# Patient Record
Sex: FEMALE | Race: WHITE | NOT HISPANIC OR LATINO | Employment: OTHER | URBAN - METROPOLITAN AREA
[De-identification: names, ages, dates, MRNs, and addresses within clinical notes are randomized per-mention and may not be internally consistent; named-entity substitution may affect disease eponyms.]

---

## 2018-01-26 ENCOUNTER — CATARACT CONSULT (OUTPATIENT)
Dept: URBAN - METROPOLITAN AREA CLINIC 35 | Facility: CLINIC | Age: 63
End: 2018-01-26

## 2018-01-26 VITALS
DIASTOLIC BLOOD PRESSURE: 99 MMHG | HEIGHT: 55 IN | SYSTOLIC BLOOD PRESSURE: 150 MMHG | RESPIRATION RATE: 14 BRPM | HEART RATE: 74 BPM

## 2018-01-26 DIAGNOSIS — Z98.890: ICD-10-CM

## 2018-01-26 DIAGNOSIS — H25.812: ICD-10-CM

## 2018-01-26 DIAGNOSIS — H25.811: ICD-10-CM

## 2018-01-26 PROCEDURE — 92004 COMPRE OPH EXAM NEW PT 1/>: CPT

## 2018-01-26 PROCEDURE — 92136TC INTERFEROMETRY - TECHNICAL COMPONENT

## 2018-01-26 PROCEDURE — 92134 CPTRZ OPH DX IMG PST SGM RTA: CPT

## 2018-01-26 PROCEDURE — 92015 DETERMINE REFRACTIVE STATE: CPT

## 2018-01-26 RX ORDER — NEPAFENAC 3 MG/ML: 1 SUSPENSION/ DROPS OPHTHALMIC ONCE A DAY

## 2018-01-26 RX ORDER — MOXIFLOXACIN HYDROCHLORIDE 5 MG/ML: 1 SOLUTION/ DROPS OPHTHALMIC

## 2018-01-26 RX ORDER — DUREZOL 0.5 MG/ML: 1 EMULSION OPHTHALMIC TWICE A DAY

## 2018-01-26 ASSESSMENT — VISUAL ACUITY
OS_SC: J10
OS_CC: 20/40
OS_CC: J8
OS_SC: 20/200
OD_SC: 20/400
OD_CC: 20/80
OD_SC: J12

## 2018-01-26 ASSESSMENT — TONOMETRY
OS_IOP_MMHG: 14
OD_IOP_MMHG: 12

## 2018-05-10 ENCOUNTER — SURGERY/PROCEDURE (OUTPATIENT)
Dept: URBAN - METROPOLITAN AREA CLINIC 35 | Facility: CLINIC | Age: 63
End: 2018-05-10

## 2018-05-10 DIAGNOSIS — H25.811: ICD-10-CM

## 2018-05-10 PROCEDURE — 66984 XCAPSL CTRC RMVL W/O ECP: CPT

## 2018-05-11 ENCOUNTER — POST-OP CATARACT (OUTPATIENT)
Dept: URBAN - METROPOLITAN AREA CLINIC 35 | Facility: CLINIC | Age: 63
End: 2018-05-11

## 2018-05-11 DIAGNOSIS — H25.812: ICD-10-CM

## 2018-05-11 DIAGNOSIS — Z96.1: ICD-10-CM

## 2018-05-11 PROCEDURE — 99024 POSTOP FOLLOW-UP VISIT: CPT

## 2018-05-11 ASSESSMENT — TONOMETRY: OD_IOP_MMHG: 22

## 2018-05-11 ASSESSMENT — VISUAL ACUITY: OD_SC: 20/30-2

## 2018-05-17 ENCOUNTER — SURGERY/PROCEDURE (OUTPATIENT)
Dept: URBAN - METROPOLITAN AREA CLINIC 35 | Facility: CLINIC | Age: 63
End: 2018-05-17

## 2018-05-17 ENCOUNTER — POST OP/EVAL OF SECOND EYE (OUTPATIENT)
Dept: URBAN - METROPOLITAN AREA SURGERY 14 | Facility: SURGERY | Age: 63
End: 2018-05-17

## 2018-05-17 DIAGNOSIS — H25.812: ICD-10-CM

## 2018-05-17 PROCEDURE — G8427 DOCREV CUR MEDS BY ELIG CLIN: HCPCS

## 2018-05-17 PROCEDURE — 4040F PNEUMOC VAC/ADMIN/RCVD: CPT

## 2018-05-17 PROCEDURE — G8482 FLU IMMUNIZE ORDER/ADMIN: HCPCS

## 2018-05-17 PROCEDURE — 66984 XCAPSL CTRC RMVL W/O ECP: CPT

## 2018-05-17 PROCEDURE — G8785 BP SCRN NO PERF AT INTERVAL: HCPCS

## 2018-05-17 PROCEDURE — 99213 OFFICE O/P EST LOW 20 MIN: CPT

## 2018-05-17 PROCEDURE — 1036F TOBACCO NON-USER: CPT

## 2018-05-17 ASSESSMENT — TONOMETRY
OD_IOP_MMHG: 12
OS_IOP_MMHG: 14

## 2018-05-17 ASSESSMENT — VISUAL ACUITY
OD_SC: 20/20
OS_SC: 20/200
OS_CC: 20/40
OS_BAT: 20/200

## 2018-05-18 ENCOUNTER — CATARACT POST-OP 1-DAY (OUTPATIENT)
Dept: URBAN - METROPOLITAN AREA CLINIC 35 | Facility: CLINIC | Age: 63
End: 2018-05-18

## 2018-05-18 DIAGNOSIS — Z96.1: ICD-10-CM

## 2018-05-18 ASSESSMENT — VISUAL ACUITY
OD_SC: 20/20
OS_SC: 20/40-1

## 2018-05-18 ASSESSMENT — TONOMETRY: OS_IOP_MMHG: 16

## 2018-06-01 ENCOUNTER — POST-OP CATARACT (OUTPATIENT)
Dept: URBAN - METROPOLITAN AREA CLINIC 35 | Facility: CLINIC | Age: 63
End: 2018-06-01

## 2018-06-01 DIAGNOSIS — Z96.1: ICD-10-CM

## 2018-06-01 PROCEDURE — 99024 POSTOP FOLLOW-UP VISIT: CPT

## 2018-06-01 ASSESSMENT — VISUAL ACUITY
OS_SC: 20/50
OS_SC: J<12
OD_SC: 20/60-1
OD_SC: J<12

## 2018-06-01 ASSESSMENT — TONOMETRY
OD_IOP_MMHG: 16
OS_IOP_MMHG: 16

## 2018-06-15 ENCOUNTER — RETINA CONSULT (OUTPATIENT)
Dept: URBAN - METROPOLITAN AREA CLINIC 43 | Facility: CLINIC | Age: 63
End: 2018-06-15

## 2018-06-15 DIAGNOSIS — H43.813: ICD-10-CM

## 2018-06-15 DIAGNOSIS — H35.353: ICD-10-CM

## 2018-06-15 DIAGNOSIS — H35.30: ICD-10-CM

## 2018-06-15 DIAGNOSIS — H35.373: ICD-10-CM

## 2018-06-15 DIAGNOSIS — E11.3211: ICD-10-CM

## 2018-06-15 DIAGNOSIS — E11.3212: ICD-10-CM

## 2018-06-15 DIAGNOSIS — E11.39: ICD-10-CM

## 2018-06-15 PROCEDURE — J2778DME LUCENTIS 0.3MG

## 2018-06-15 PROCEDURE — 2022F DILAT RTA XM EVC RTNOPTHY: CPT

## 2018-06-15 PROCEDURE — 9222550 BILAT EXTENDED OPHTHALMOSCOPY, FIRST

## 2018-06-15 PROCEDURE — 67028 INJECTION EYE DRUG: CPT

## 2018-06-15 PROCEDURE — 92014 COMPRE OPH EXAM EST PT 1/>: CPT

## 2018-06-15 PROCEDURE — 2019F DILATED MACUL EXAM DONE: CPT

## 2018-06-15 PROCEDURE — 92250 FUNDUS PHOTOGRAPHY W/I&R: CPT

## 2018-06-15 PROCEDURE — 92287 ANT SGM IMG IR FLRSCN ANGRPH: CPT

## 2018-06-15 PROCEDURE — G8785 BP SCRN NO PERF AT INTERVAL: HCPCS

## 2018-06-15 PROCEDURE — 1036F TOBACCO NON-USER: CPT

## 2018-06-15 PROCEDURE — 4177F TALK PT/CRGVR RE AREDS PREV: CPT

## 2018-06-15 PROCEDURE — G8428 CUR MEDS NOT DOCUMENT: HCPCS

## 2018-06-15 PROCEDURE — 92235 FLUORESCEIN ANGRPH MLTIFRAME: CPT

## 2018-06-15 PROCEDURE — G8397 DIL MACULA/FUNDUS EXAM/W DOC: HCPCS

## 2018-06-15 PROCEDURE — 5010F MACUL RESULT PHY/QHP MNG DM: CPT

## 2018-06-15 ASSESSMENT — TONOMETRY
OS_IOP_MMHG: 12
OD_IOP_MMHG: 13

## 2018-06-15 ASSESSMENT — VISUAL ACUITY
OD_SC: 20/40
OS_PH: 20/25
OS_SC: 20/40

## 2018-07-19 ENCOUNTER — ESTABLISHED PATIENT (OUTPATIENT)
Dept: URBAN - METROPOLITAN AREA CLINIC 35 | Facility: CLINIC | Age: 63
End: 2018-07-19

## 2018-07-19 DIAGNOSIS — H43.813: ICD-10-CM

## 2018-07-19 DIAGNOSIS — H35.353: ICD-10-CM

## 2018-07-19 DIAGNOSIS — H35.373: ICD-10-CM

## 2018-07-19 DIAGNOSIS — E11.3211: ICD-10-CM

## 2018-07-19 DIAGNOSIS — H35.30: ICD-10-CM

## 2018-07-19 DIAGNOSIS — E11.3212: ICD-10-CM

## 2018-07-19 PROCEDURE — 4177F TALK PT/CRGVR RE AREDS PREV: CPT

## 2018-07-19 PROCEDURE — G8427 DOCREV CUR MEDS BY ELIG CLIN: HCPCS

## 2018-07-19 PROCEDURE — 5010F MACUL RESULT PHY/QHP MNG DM: CPT

## 2018-07-19 PROCEDURE — 9222650 BILAT EXTENDED OPHTHALMOSCOPY, F/U

## 2018-07-19 PROCEDURE — 2022F DILAT RTA XM EVC RTNOPTHY: CPT

## 2018-07-19 PROCEDURE — 92134 CPTRZ OPH DX IMG PST SGM RTA: CPT

## 2018-07-19 PROCEDURE — J2778DME LUCENTIS 0.3MG

## 2018-07-19 PROCEDURE — 1036F TOBACCO NON-USER: CPT

## 2018-07-19 PROCEDURE — 6702850 BILATERAL INTRAVITREAL INJECTION

## 2018-07-19 PROCEDURE — 2019F DILATED MACUL EXAM DONE: CPT

## 2018-07-19 PROCEDURE — G8397 DIL MACULA/FUNDUS EXAM/W DOC: HCPCS

## 2018-07-19 PROCEDURE — G8785 BP SCRN NO PERF AT INTERVAL: HCPCS

## 2018-07-19 PROCEDURE — 92012 INTRM OPH EXAM EST PATIENT: CPT

## 2018-07-19 ASSESSMENT — VISUAL ACUITY
OD_SC: 20/40+2
OS_SC: 20/40

## 2018-07-19 ASSESSMENT — TONOMETRY
OS_IOP_MMHG: 17
OD_IOP_MMHG: 24

## 2018-08-16 ENCOUNTER — ESTABLISHED PATIENT (OUTPATIENT)
Dept: URBAN - METROPOLITAN AREA CLINIC 35 | Facility: CLINIC | Age: 63
End: 2018-08-16

## 2018-08-16 DIAGNOSIS — H35.373: ICD-10-CM

## 2018-08-16 DIAGNOSIS — E11.3212: ICD-10-CM

## 2018-08-16 DIAGNOSIS — H43.813: ICD-10-CM

## 2018-08-16 DIAGNOSIS — H35.353: ICD-10-CM

## 2018-08-16 DIAGNOSIS — E11.3211: ICD-10-CM

## 2018-08-16 DIAGNOSIS — H35.30: ICD-10-CM

## 2018-08-16 DIAGNOSIS — Z98.890: ICD-10-CM

## 2018-08-16 DIAGNOSIS — E11.39: ICD-10-CM

## 2018-08-16 PROCEDURE — 1036F TOBACCO NON-USER: CPT

## 2018-08-16 PROCEDURE — 6702850 BILATERAL INTRAVITREAL INJECTION

## 2018-08-16 PROCEDURE — 4177F TALK PT/CRGVR RE AREDS PREV: CPT

## 2018-08-16 PROCEDURE — 92012 INTRM OPH EXAM EST PATIENT: CPT

## 2018-08-16 PROCEDURE — G8427 DOCREV CUR MEDS BY ELIG CLIN: HCPCS

## 2018-08-16 PROCEDURE — G8785 BP SCRN NO PERF AT INTERVAL: HCPCS

## 2018-08-16 PROCEDURE — 92235 FLUORESCEIN ANGRPH MLTIFRAME: CPT

## 2018-08-16 PROCEDURE — 2022F DILAT RTA XM EVC RTNOPTHY: CPT

## 2018-08-16 PROCEDURE — J2778DME LUCENTIS 0.3MG

## 2018-08-16 PROCEDURE — 5010F MACUL RESULT PHY/QHP MNG DM: CPT

## 2018-08-16 PROCEDURE — 9222650 BILAT EXTENDED OPHTHALMOSCOPY, F/U

## 2018-08-16 PROCEDURE — G9974 MAC EXAM PERF: HCPCS

## 2018-08-16 PROCEDURE — 92250 FUNDUS PHOTOGRAPHY W/I&R: CPT

## 2018-08-16 PROCEDURE — G8397 DIL MACULA/FUNDUS EXAM/W DOC: HCPCS

## 2018-08-16 PROCEDURE — 2019F DILATED MACUL EXAM DONE: CPT

## 2018-08-16 PROCEDURE — G9903 PT SCRN TBCO ID AS NON USER: HCPCS

## 2018-08-16 PROCEDURE — 92287 ANT SGM IMG IR FLRSCN ANGRPH: CPT

## 2018-08-16 ASSESSMENT — TONOMETRY
OS_IOP_MMHG: 25
OD_IOP_MMHG: 21

## 2018-08-16 ASSESSMENT — VISUAL ACUITY
OD_SC: 20/40+2
OS_PH: 20/20
OD_PH: 20/25
OS_SC: 20/40

## 2018-09-27 ENCOUNTER — ESTABLISHED PATIENT (OUTPATIENT)
Dept: URBAN - METROPOLITAN AREA CLINIC 35 | Facility: CLINIC | Age: 63
End: 2018-09-27

## 2018-09-27 DIAGNOSIS — H35.373: ICD-10-CM

## 2018-09-27 DIAGNOSIS — H35.353: ICD-10-CM

## 2018-09-27 DIAGNOSIS — E11.3211: ICD-10-CM

## 2018-09-27 DIAGNOSIS — E11.3212: ICD-10-CM

## 2018-09-27 DIAGNOSIS — H43.813: ICD-10-CM

## 2018-09-27 DIAGNOSIS — H35.30: ICD-10-CM

## 2018-09-27 PROCEDURE — G9974 MAC EXAM PERF: HCPCS

## 2018-09-27 PROCEDURE — 2022F DILAT RTA XM EVC RTNOPTHY: CPT

## 2018-09-27 PROCEDURE — G8428 CUR MEDS NOT DOCUMENT: HCPCS

## 2018-09-27 PROCEDURE — 6702850 BILATERAL INTRAVITREAL INJECTION

## 2018-09-27 PROCEDURE — 92134 CPTRZ OPH DX IMG PST SGM RTA: CPT

## 2018-09-27 PROCEDURE — G9903 PT SCRN TBCO ID AS NON USER: HCPCS

## 2018-09-27 PROCEDURE — 4177F TALK PT/CRGVR RE AREDS PREV: CPT

## 2018-09-27 PROCEDURE — G8785 BP SCRN NO PERF AT INTERVAL: HCPCS

## 2018-09-27 PROCEDURE — 9222650 BILAT EXTENDED OPHTHALMOSCOPY, F/U

## 2018-09-27 PROCEDURE — 5010F MACUL RESULT PHY/QHP MNG DM: CPT

## 2018-09-27 PROCEDURE — 92012 INTRM OPH EXAM EST PATIENT: CPT

## 2018-09-27 PROCEDURE — 2019F DILATED MACUL EXAM DONE: CPT

## 2018-09-27 PROCEDURE — G8397 DIL MACULA/FUNDUS EXAM/W DOC: HCPCS

## 2018-09-27 PROCEDURE — 1036F TOBACCO NON-USER: CPT

## 2018-09-27 PROCEDURE — J2778DME LUCENTIS 0.3MG

## 2018-09-27 ASSESSMENT — TONOMETRY
OD_IOP_MMHG: 21
OS_IOP_MMHG: 21

## 2018-09-27 ASSESSMENT — VISUAL ACUITY
OS_SC: 20/25-1
OD_SC: 20/30-1

## 2018-11-05 ENCOUNTER — EST. PATIENT EMERGENCY (OUTPATIENT)
Dept: URBAN - METROPOLITAN AREA CLINIC 35 | Facility: CLINIC | Age: 63
End: 2018-11-05

## 2018-11-05 DIAGNOSIS — E11.3211: ICD-10-CM

## 2018-11-05 DIAGNOSIS — G43.119: ICD-10-CM

## 2018-11-05 DIAGNOSIS — E11.3212: ICD-10-CM

## 2018-11-05 PROCEDURE — 92012 INTRM OPH EXAM EST PATIENT: CPT

## 2018-11-05 PROCEDURE — G9903 PT SCRN TBCO ID AS NON USER: HCPCS

## 2018-11-05 PROCEDURE — 2022F DILAT RTA XM EVC RTNOPTHY: CPT

## 2018-11-05 PROCEDURE — G8427 DOCREV CUR MEDS BY ELIG CLIN: HCPCS

## 2018-11-05 PROCEDURE — 5010F MACUL RESULT PHY/QHP MNG DM: CPT

## 2018-11-05 PROCEDURE — 1036F TOBACCO NON-USER: CPT

## 2018-11-05 PROCEDURE — 92134 CPTRZ OPH DX IMG PST SGM RTA: CPT

## 2018-11-05 PROCEDURE — G8397 DIL MACULA/FUNDUS EXAM/W DOC: HCPCS

## 2018-11-05 ASSESSMENT — TONOMETRY
OS_IOP_MMHG: 20
OD_IOP_MMHG: 20

## 2018-11-05 ASSESSMENT — VISUAL ACUITY
OD_SC: 20/50+2
OS_SC: 20/40-1

## 2018-11-08 ENCOUNTER — ESTABLISHED PATIENT (OUTPATIENT)
Dept: URBAN - METROPOLITAN AREA CLINIC 35 | Facility: CLINIC | Age: 63
End: 2018-11-08

## 2018-11-08 DIAGNOSIS — G43.119: ICD-10-CM

## 2018-11-08 DIAGNOSIS — E11.39: ICD-10-CM

## 2018-11-08 DIAGNOSIS — E11.3211: ICD-10-CM

## 2018-11-08 DIAGNOSIS — R51: ICD-10-CM

## 2018-11-08 DIAGNOSIS — H35.353: ICD-10-CM

## 2018-11-08 DIAGNOSIS — H35.30: ICD-10-CM

## 2018-11-08 DIAGNOSIS — E11.3212: ICD-10-CM

## 2018-11-08 PROCEDURE — 92235 FLUORESCEIN ANGRPH MLTIFRAME: CPT

## 2018-11-08 PROCEDURE — 2019F DILATED MACUL EXAM DONE: CPT

## 2018-11-08 PROCEDURE — 5010F MACUL RESULT PHY/QHP MNG DM: CPT

## 2018-11-08 PROCEDURE — 92287 ANT SGM IMG IR FLRSCN ANGRPH: CPT

## 2018-11-08 PROCEDURE — 92014 COMPRE OPH EXAM EST PT 1/>: CPT

## 2018-11-08 PROCEDURE — 1036F TOBACCO NON-USER: CPT

## 2018-11-08 PROCEDURE — 67028 INJECTION EYE DRUG: CPT

## 2018-11-08 PROCEDURE — G8397 DIL MACULA/FUNDUS EXAM/W DOC: HCPCS

## 2018-11-08 PROCEDURE — J2778DME LUCENTIS 0.3MG

## 2018-11-08 PROCEDURE — G9903 PT SCRN TBCO ID AS NON USER: HCPCS

## 2018-11-08 PROCEDURE — 9222650 BILAT EXTENDED OPHTHALMOSCOPY, F/U

## 2018-11-08 PROCEDURE — G8427 DOCREV CUR MEDS BY ELIG CLIN: HCPCS

## 2018-11-08 PROCEDURE — 4177F TALK PT/CRGVR RE AREDS PREV: CPT

## 2018-11-08 PROCEDURE — G8785 BP SCRN NO PERF AT INTERVAL: HCPCS

## 2018-11-08 PROCEDURE — 2022F DILAT RTA XM EVC RTNOPTHY: CPT

## 2018-11-08 PROCEDURE — 92250 FUNDUS PHOTOGRAPHY W/I&R: CPT

## 2018-11-08 ASSESSMENT — TONOMETRY
OS_IOP_MMHG: 11
OD_IOP_MMHG: 12

## 2018-11-08 ASSESSMENT — VISUAL ACUITY
OD_SC: 20/40
OS_SC: 20/50-1

## 2019-01-31 ENCOUNTER — ESTABLISHED PATIENT (OUTPATIENT)
Dept: URBAN - METROPOLITAN AREA CLINIC 35 | Facility: CLINIC | Age: 64
End: 2019-01-31

## 2019-01-31 DIAGNOSIS — E11.3211: ICD-10-CM

## 2019-01-31 DIAGNOSIS — H35.30: ICD-10-CM

## 2019-01-31 DIAGNOSIS — E11.3212: ICD-10-CM

## 2019-01-31 PROCEDURE — 92235 FLUORESCEIN ANGRPH MLTIFRAME: CPT

## 2019-01-31 PROCEDURE — 92012 INTRM OPH EXAM EST PATIENT: CPT

## 2019-01-31 PROCEDURE — 67028 INJECTION EYE DRUG: CPT

## 2019-01-31 PROCEDURE — 92250 FUNDUS PHOTOGRAPHY W/I&R: CPT

## 2019-01-31 ASSESSMENT — VISUAL ACUITY
OS_PH: 20/30-1
OS_SC: 20/50-2
OD_SC: 20/40

## 2019-01-31 ASSESSMENT — TONOMETRY
OS_IOP_MMHG: 14
OD_IOP_MMHG: 14

## 2019-02-28 ENCOUNTER — ESTABLISHED PATIENT (OUTPATIENT)
Dept: URBAN - METROPOLITAN AREA CLINIC 35 | Facility: CLINIC | Age: 64
End: 2019-02-28

## 2019-02-28 DIAGNOSIS — H35.353: ICD-10-CM

## 2019-02-28 DIAGNOSIS — H43.813: ICD-10-CM

## 2019-02-28 DIAGNOSIS — E11.3212: ICD-10-CM

## 2019-02-28 DIAGNOSIS — H35.373: ICD-10-CM

## 2019-02-28 DIAGNOSIS — E11.3211: ICD-10-CM

## 2019-02-28 PROCEDURE — 92012 INTRM OPH EXAM EST PATIENT: CPT

## 2019-02-28 PROCEDURE — 92134 CPTRZ OPH DX IMG PST SGM RTA: CPT

## 2019-02-28 PROCEDURE — 67028 INJECTION EYE DRUG: CPT

## 2019-02-28 ASSESSMENT — VISUAL ACUITY
OD_SC: 20/30-2
OS_SC: 20/30

## 2019-02-28 ASSESSMENT — TONOMETRY
OD_IOP_MMHG: 16
OS_IOP_MMHG: 13

## 2019-04-04 ENCOUNTER — ESTABLISHED PATIENT (OUTPATIENT)
Dept: URBAN - METROPOLITAN AREA CLINIC 35 | Facility: CLINIC | Age: 64
End: 2019-04-04

## 2019-04-04 DIAGNOSIS — H35.30: ICD-10-CM

## 2019-04-04 DIAGNOSIS — H35.373: ICD-10-CM

## 2019-04-04 DIAGNOSIS — E11.3211: ICD-10-CM

## 2019-04-04 DIAGNOSIS — H43.813: ICD-10-CM

## 2019-04-04 DIAGNOSIS — E11.3212: ICD-10-CM

## 2019-04-04 DIAGNOSIS — Z79.4: ICD-10-CM

## 2019-04-04 DIAGNOSIS — E11.39: ICD-10-CM

## 2019-04-04 DIAGNOSIS — G43.119: ICD-10-CM

## 2019-04-04 DIAGNOSIS — Z98.890: ICD-10-CM

## 2019-04-04 DIAGNOSIS — H35.353: ICD-10-CM

## 2019-04-04 PROCEDURE — 92235 FLUORESCEIN ANGRPH MLTIFRAME: CPT

## 2019-04-04 PROCEDURE — 92134 CPTRZ OPH DX IMG PST SGM RTA: CPT

## 2019-04-04 PROCEDURE — 92250 FUNDUS PHOTOGRAPHY W/I&R: CPT

## 2019-04-04 PROCEDURE — 9222650 BILAT EXTENDED OPHTHALMOSCOPY, F/U

## 2019-04-04 PROCEDURE — 92287 ANT SGM IMG IR FLRSCN ANGRPH: CPT

## 2019-04-04 PROCEDURE — 92012 INTRM OPH EXAM EST PATIENT: CPT

## 2019-04-04 ASSESSMENT — TONOMETRY
OS_IOP_MMHG: 19
OD_IOP_MMHG: 16

## 2019-04-04 ASSESSMENT — VISUAL ACUITY
OS_SC: 20/30-1
OD_SC: 20/40

## 2019-05-30 ENCOUNTER — ESTABLISHED PATIENT (OUTPATIENT)
Dept: URBAN - METROPOLITAN AREA CLINIC 35 | Facility: CLINIC | Age: 64
End: 2019-05-30

## 2019-05-30 DIAGNOSIS — E11.3212: ICD-10-CM

## 2019-05-30 DIAGNOSIS — E11.3211: ICD-10-CM

## 2019-05-30 DIAGNOSIS — Z79.4: ICD-10-CM

## 2019-05-30 PROCEDURE — 92134 CPTRZ OPH DX IMG PST SGM RTA: CPT

## 2019-05-30 PROCEDURE — 92012 INTRM OPH EXAM EST PATIENT: CPT

## 2019-05-30 ASSESSMENT — TONOMETRY
OS_IOP_MMHG: 20
OD_IOP_MMHG: 18

## 2019-05-30 ASSESSMENT — VISUAL ACUITY
OD_SC: 20/40-1
OS_SC: 20/30+1

## 2019-08-29 ENCOUNTER — ESTABLISHED PATIENT (OUTPATIENT)
Dept: URBAN - METROPOLITAN AREA CLINIC 35 | Facility: CLINIC | Age: 64
End: 2019-08-29

## 2019-08-29 DIAGNOSIS — G43.119: ICD-10-CM

## 2019-08-29 DIAGNOSIS — Z98.890: ICD-10-CM

## 2019-08-29 DIAGNOSIS — E11.39: ICD-10-CM

## 2019-08-29 DIAGNOSIS — H35.373: ICD-10-CM

## 2019-08-29 DIAGNOSIS — Z79.4: ICD-10-CM

## 2019-08-29 DIAGNOSIS — H43.813: ICD-10-CM

## 2019-08-29 DIAGNOSIS — H35.353: ICD-10-CM

## 2019-08-29 DIAGNOSIS — E11.3211: ICD-10-CM

## 2019-08-29 DIAGNOSIS — E11.3212: ICD-10-CM

## 2019-08-29 PROCEDURE — 92287 ANT SGM IMG IR FLRSCN ANGRPH: CPT

## 2019-08-29 PROCEDURE — 92014 COMPRE OPH EXAM EST PT 1/>: CPT

## 2019-08-29 PROCEDURE — 92235 FLUORESCEIN ANGRPH MLTIFRAME: CPT

## 2019-08-29 PROCEDURE — 92134 CPTRZ OPH DX IMG PST SGM RTA: CPT

## 2019-08-29 PROCEDURE — 92250 FUNDUS PHOTOGRAPHY W/I&R: CPT

## 2019-08-29 ASSESSMENT — VISUAL ACUITY
OS_SC: 20/30
OD_SC: 20/30

## 2019-08-29 ASSESSMENT — TONOMETRY
OS_IOP_MMHG: 18
OD_IOP_MMHG: 16

## 2019-09-17 NOTE — PATIENT DISCUSSION
Admitting to hospital for intravenous penicillin and PO doxycycline for 1 week.  R/O syphilis or cytomegalovirus retinitis. 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

## 2019-09-17 NOTE — PATIENT DISCUSSION
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 Patient also symptomatic with night sweats, possible HIV coinfection.

## 2019-09-17 NOTE — PATIENT DISCUSSION
Patient is heading straight to [facility] office for evaluation by Dr. Roya Zeng.

## 2019-09-24 NOTE — PATIENT DISCUSSION
Pt released from hospital yesterday.  Pt is under the care of Dr. Sky Womack, Infectious disease.  Will forward records.

## 2019-09-24 NOTE — PATIENT DISCUSSION
Patient educated on condition and possible poor visual outcome due to advanced disease of #1.  Need to tx systemic cause first, possible sx discussed.

## 2019-09-26 NOTE — PATIENT DISCUSSION
Pt edu, inflammation continues to improve.  Recommended STK injection today.  Continue Pred Acetate q3h OU, Atropine BID OU, and Prednisone 5mg PO QD.

## 2019-09-26 NOTE — PROCEDURE NOTE: CLINICAL
PROCEDURE NOTE: Sub-Tenon’s Triamcinolone #1 OD. Diagnosis: Syphilitic Uveitis. Prior to injection, risks/benefits/alternatives discussed including infection, loss of vision, hemorrhage, cataract, glaucoma, elevated intraocular pressure and lid swelling. Betadine prep was performed. Sub-Tenon’s injection of Triamcinolone 40 mg/1 ml was given. Patient tolerated procedure well. There were no complications. Post-op instructions given. Tracking # *. Lot #: [id number]. Expiration Date: [id number]:00:00. Patient given office phone number/answering service number and advised to call immediately should there be an increase in floaters or redness, loss of vision or pain, or should they have any other questions or concerns. Inventory Id: [email or web address]. Octavia Issa PROCEDURE NOTE: Sub-Tenon’s Triamcinolone #1 OS. Diagnosis: Syphilitic Uveitis. Prior to injection, risks/benefits/alternatives discussed including infection, loss of vision, hemorrhage, cataract, glaucoma, elevated intraocular pressure and lid swelling. Betadine prep was performed. Sub-Tenon’s injection of Triamcinolone 40 mg/1 ml was given. Patient tolerated procedure well. There were no complications. Post-op instructions given. Tracking # *. Lot #: [id number]. Expiration Date: [id number]:00:00. Patient given office phone number/answering service number and advised to call immediately should there be an increase in floaters or redness, loss of vision or pain, or should they have any other questions or concerns. Inventory Id: ebony Issa

## 2019-09-26 NOTE — PATIENT DISCUSSION
Patient educated on condition and possible poor visual outcome due to advanced disease of #1-2.  Need to tx systemic cause first, possible sx discussed.

## 2019-10-01 NOTE — PATIENT DISCUSSION
Discussed with pt, now that the treatment for #4 is done and vitritis has resolved, surgery is recommended to repair the RD.  Surgery will be done at [facility] due to insurance.

## 2019-10-01 NOTE — PATIENT DISCUSSION
Resolved vitritis. Continue Pred Acetate q3h OU, Atropine BID OU, and Prednisone 5mg PO QD, until bottle is empty.

## 2019-10-01 NOTE — PATIENT DISCUSSION
Improving today.  Discussed will do sx OS first, then will OD in future after OS is healed.  Patient educated on condition and possible poor visual outcome due to advanced disease of #3/4.

## 2019-10-01 NOTE — PATIENT DISCUSSION
Pt will start HAART at clinic today.  Pt is under the care of Dr. Davida eHrnandez, Infectious disease.

## 2019-10-08 NOTE — PATIENT DISCUSSION
Discussed guarded visual prognosis due to advanced pathology/long standing RD.  Discussed Analia Oil insertion that will need to be removed at a later date.  Analia oil will cause refractive error with current glasses.

## 2019-10-08 NOTE — PATIENT DISCUSSION
Pt started Bictegravir Sodium, Emtricitabine, and Tenofovir Alafenamide Fumarate for treatment. Pt receiving care from [facility].

## 2019-10-08 NOTE — PATIENT DISCUSSION
After discussion of risks, benefits, and alternatives, including loss of vision, blindness, need for additional surgery and/or retinal re-detachment, patient elects Retinal surgery.  Plan 23g PPV/Analia Oil insertion.

## 2019-10-08 NOTE — PATIENT DISCUSSION
Improving today.  Discussed will do sx OS first, then will OD in future after OS is healed.  Patient educated on condition and possible poor visual outcome due to advanced disease of #3-4.

## 2019-10-08 NOTE — PATIENT DISCUSSION
Pt is under the care of [employer] program and Darron Flowers () will coordinate with [facility] [place] for the surgery.

## 2019-10-22 NOTE — PATIENT DISCUSSION
Discussed unknown visual prognosis but will have functional vision.  Advised kev oil will cause refractive error with current glasses.

## 2019-10-22 NOTE — PATIENT DISCUSSION
Pt taking Bictegravir Sodium, Emtricitabine, and Tenofovir Alafenamide Fumarate for treatment. Pt receiving care from [facility].

## 2019-10-22 NOTE — PATIENT DISCUSSION
Recommended vitrectomy for vitreous hemorrhage after discussion of risks/benefits/alternatives.  See #1.

## 2019-10-22 NOTE — PATIENT DISCUSSION
Pt edu is now flattening, still has peripheral traction.  Due to uveitic cataract recommended cataract surgery first.  Discussed possible combo case with Dr. Nona Cox will have to wait 3 months after treatment of #6 to have surgery.

## 2019-10-24 NOTE — PATIENT DISCUSSION
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  Recommended vitrectomy surgery after discussion of risks, benefits, and alternatives, including loss of vision, blindness, need for additional surgery and/or retinal detachment, patient elects Retinal surgery.  Surgery will be at [address] [place] [address] [place].   23g PPV/MP/EL/Diathermy/Analia oil insertion.  Advised cataract surgery will be at a later date.

## 2019-10-24 NOTE — PATIENT DISCUSSION
Pt taking Bictegravir Sodium, Emtricitabine, and Tenofovir Alafenamide Fumarate for treatment. Pt receiving care from [facility].

## 2019-10-24 NOTE — PATIENT DISCUSSION
Doing well, retina attached, good IOP with no signs of endophthalmitis.  Post-op instructions given. Discussed drops, positioning, no strenuous activity and eye protection at night. No over 20 lbs, no swimming 2 weeks. Surgery was anatomically successful.  Final visual acuity after complete healing is hard to predict at this point. Advised to call [facility] immediately if eye pain or loss of vision.

## 2019-10-24 NOTE — PATIENT DISCUSSION
Pt edu is now flattening, still has peripheral traction.  Due to uveitic cataract recommended cataract surgery first.  Discussed possible combo case with Dr. Clarke Ou will have to wait 3 months after treatment of #6 to have surgery.

## 2019-10-25 NOTE — PATIENT DISCUSSION
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  Recommended vitrectomy surgery after discussion of risks, benefits, and alternatives, including loss of vision, blindness, need for additional surgery and/or retinal detachment, patient elects Retinal surgery.  Surgery will be at [address] [place] [address] [place].   23g PPV/MP/EL/Diathermy/Analia oil insertion.  Advised cataract surgery will be at a later date.

## 2019-10-25 NOTE — PATIENT DISCUSSION
GTTS: Continue Atropine BID OU until bottle is empty, then start cyclogyl BID OU, Maxitrol QID sample lot [id number], 3/2021, Sample of Refresh celluvisc given for discomfort from sutures.

## 2019-10-25 NOTE — PATIENT DISCUSSION
Pt taking Bictegravir Sodium, Emtricitabine, and Tenofovir Alafenamide Fumarate for treatment. Pt receiving care from [facility].

## 2019-10-25 NOTE — PATIENT DISCUSSION
Doing well, retina attached, good IOP with no signs of endophthalmitis.  Post-op instructions given. Discussed drops, positioning, no strenuous activity and eye protection at night. No lifting over 20 lbs, no swimming for 2 weeks.  Surgery was anatomically successful.  Final visual acuity after complete healing is hard to predict at this point.  Advised to call [facility] immediately if eye pain or loss of vision.

## 2019-10-31 NOTE — PATIENT DISCUSSION
GTTS: Continue Atropine BID OU until bottle is empty, then start cyclogyl BID OU, Continue Maxitrol QID.

## 2019-10-31 NOTE — PATIENT DISCUSSION
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  Recommended vitrectomy surgery after discussion of risks, benefits, and alternatives, including loss of vision, blindness, need for additional surgery and/or retinal detachment, patient elects Retinal surgery.  Surgery will be at [facility].   23g PPV/MP/EL/Diathermy/Analia oil insertion.  Advised cataract surgery will be at a later date.

## 2019-10-31 NOTE — PATIENT DISCUSSION
Pt taking Bictegravir Sodium, Emtricitabine, and Tenofovir Alafenamide Fumarate for treatment. Pt receiving care from [facility].

## 2019-11-08 NOTE — PATIENT DISCUSSION
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  Recommended vitrectomy surgery after discussion of risks, benefits, and alternatives, including loss of vision, blindness, need for additional surgery and/or retinal detachment, patient elects Retinal surgery.  Surgery will be at [address] [place] [address] Sw.   23g PPV/MP/EL/Diathermy/Analia oil insertion.  Advised cataract surgery will be at a later date.

## 2019-11-08 NOTE — PATIENT DISCUSSION
Pt taking Bictegravir Sodium, Emtricitabine, and Tenofovir Alafenamide Fumarate for treatment. Pt receiving care from [facility].

## 2019-11-08 NOTE — PATIENT DISCUSSION
Wearing glasses now with kev oil OD will cause dizziness.  Gave Trial +5.00 SCL to wear until next visit.

## 2019-11-12 NOTE — PATIENT DISCUSSION
Continues to improve, trace SRF, peripheral tractional band that might pull, will continue to monitor closely.

## 2019-11-12 NOTE — PATIENT DISCUSSION
Pt taking Bictegravir Sodium, Emtricitabine, and Tenofovir Alafenamide Fumarate for treatment. Pt receiving care from [facility].

## 2019-11-26 NOTE — PATIENT DISCUSSION
Pt taking Bictegravir Sodium, Emtricitabine, and Tenofovir Alafenamide Fumarate for treatment. Pt receiving care from [facility].

## 2019-11-26 NOTE — PATIENT DISCUSSION
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  Recommended vitrectomy surgery after discussion of risks, benefits, and alternatives, including loss of vision, blindness, need for additional surgery and/or retinal detachment, patient elects Retinal surgery.  Surgery will be at [facility].   23g PPV/MP/EL/Diathermy/Analia oil insertion.  Advised cataract surgery will be at a later date.

## 2019-12-05 ENCOUNTER — ESTABLISHED PATIENT (OUTPATIENT)
Dept: URBAN - METROPOLITAN AREA CLINIC 35 | Facility: CLINIC | Age: 64
End: 2019-12-05

## 2019-12-05 DIAGNOSIS — H35.373: ICD-10-CM

## 2019-12-05 DIAGNOSIS — E11.3212: ICD-10-CM

## 2019-12-05 DIAGNOSIS — H35.30: ICD-10-CM

## 2019-12-05 DIAGNOSIS — Z96.1: ICD-10-CM

## 2019-12-05 DIAGNOSIS — H35.353: ICD-10-CM

## 2019-12-05 DIAGNOSIS — E11.3211: ICD-10-CM

## 2019-12-05 DIAGNOSIS — E11.39: ICD-10-CM

## 2019-12-05 DIAGNOSIS — Z79.4: ICD-10-CM

## 2019-12-05 DIAGNOSIS — G43.119: ICD-10-CM

## 2019-12-05 DIAGNOSIS — H43.813: ICD-10-CM

## 2019-12-05 PROCEDURE — 92235 FLUORESCEIN ANGRPH MLTIFRAME: CPT

## 2019-12-05 PROCEDURE — 92250 FUNDUS PHOTOGRAPHY W/I&R: CPT

## 2019-12-05 PROCEDURE — 92287 ANT SGM IMG IR FLRSCN ANGRPH: CPT

## 2019-12-05 PROCEDURE — 92014 COMPRE OPH EXAM EST PT 1/>: CPT

## 2019-12-05 PROCEDURE — 9222650 BILAT EXTENDED OPHTHALMOSCOPY, F/U

## 2019-12-05 ASSESSMENT — TONOMETRY
OD_IOP_MMHG: 14
OS_IOP_MMHG: 16

## 2019-12-05 ASSESSMENT — VISUAL ACUITY
OS_SC: 20/25
OD_SC: 20/40

## 2019-12-20 NOTE — PATIENT DISCUSSION
Pt taking Bictegravir Sodium, Emtricitabine, and Tenofovir Alafenamide Fumarate for treatment. Pt receiving care from [facility].

## 2019-12-20 NOTE — PATIENT DISCUSSION
CME seen today.  Recommended STK #2 today.  Pt elects to proceed.  Post injection instructions were reviewed and understood by pt.

## 2019-12-20 NOTE — PATIENT DISCUSSION
High IOP today, pt ran out of Combigan x 3 days.  Start Brimonidine TID. [Jaundice] : jaundice [Negative] : Genitourinary

## 2019-12-20 NOTE — PATIENT DISCUSSION
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  Recommended vitrectomy surgery after discussion of risks, benefits, and alternatives, including loss of vision, blindness, need for additional surgery and/or retinal detachment, patient elects Retinal surgery.  Surgery will be at [facility].   23g PPV/MP/EL/Diathermy/Analia oil insertion.  Advised cataract surgery will be at a later date.

## 2019-12-20 NOTE — PROCEDURE NOTE: CLINICAL
PROCEDURE NOTE: Sub-Tenon’s Triamcinolone #2 OS. Diagnosis: Cytomegalovirus Retinitis (CMV). Prior to injection, risks/benefits/alternatives discussed including infection, loss of vision, hemorrhage, cataract, glaucoma, elevated intraocular pressure and lid swelling. Betadine prep was performed. Sub-Tenon’s injection of Triamcinolone 40 mg/1 ml was given. Patient tolerated procedure well. There were no complications. Post-op instructions given. Tracking # *. Lot #: [id number]. Expiration Date: 2020-10-31T00:00:00. Patient given office phone number/answering service number and advised to call immediately should there be an increase in floaters or redness, loss of vision or pain, or should they have any other questions or concerns. Inventory Id: null. Lot# [id number], EXP 10/31/20.

## 2020-01-10 NOTE — PATIENT DISCUSSION
Pt taking Bictegravir Sodium, Emtricitabine, and Tenofovir Alafenamide Fumarate for treatment. Pt receiving care from [facility].

## 2020-01-10 NOTE — PATIENT DISCUSSION
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  Recommended vitrectomy surgery after discussion of risks, benefits, and alternatives, including loss of vision, blindness, need for additional surgery and/or retinal detachment, patient elects Retinal surgery.  Surgery will be at [facility].   23g PPV/MP/EL/Diathermy/Analia oil insertion.  Advised cataract surgery will be at a later date.

## 2020-01-10 NOTE — PATIENT DISCUSSION
Diamox 500mg PO given in clinic.  Paracentesis done today. Continue Brimonidine TID.  Start Rocklatan qhs.

## 2020-01-14 NOTE — PATIENT DISCUSSION
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  Recommended vitrectomy surgery after discussion of risks, benefits, and alternatives, including loss of vision, blindness, need for additional surgery and/or retinal detachment, patient elects Retinal surgery.  Surgery will be at [facility].   23g PPV/MP/EL/Diathermy/Analia oil insertion.  Advised cataract surgery will be at a later date.

## 2020-01-14 NOTE — PATIENT DISCUSSION
IOP lower today but still too high.  Instilled 1 gtt of Leylaalexi Prakash @1:36, (lot# [id number], 3/2020).   IOP 17 after gtt.

## 2020-01-14 NOTE — PATIENT DISCUSSION
Pt taking Bictegravir Sodium, Emtricitabine, and Tenofovir Alafenamide Fumarate for treatment. Pt receiving care from [facility].

## 2020-01-28 NOTE — PATIENT DISCUSSION
Pt taking Bictegravir Sodium, Emtricitabine, and Tenofovir Alafenamide Fumarate for treatment. Pt receiving care from [facility].

## 2020-01-28 NOTE — PATIENT DISCUSSION
IOP a little high today but OK.  Continue Rocklatan qhs and D/C Simbrinza BID (out of samples), Start Brimonidine BID and Dorzolamide BID.

## 2020-01-28 NOTE — PATIENT DISCUSSION
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  Recommended vitrectomy surgery after discussion of risks, benefits, and alternatives, including loss of vision, blindness, need for additional surgery and/or retinal detachment, patient elects Retinal surgery.  Surgery will be at [address] [place] [address] [place].   23g PPV/MP/EL/Diathermy/Analia oil insertion.  Advised cataract surgery will be at a later date.

## 2020-02-28 NOTE — PATIENT DISCUSSION
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  Recommended vitrectomy surgery after discussion of risks, benefits, and alternatives, including loss of vision, blindness, need for additional surgery and/or retinal detachment, patient elects Retinal surgery.  Surgery will be at [facility].   23g PPV/MP/EL/Diathermy/Analia oil insertion.  Advised cataract surgery will be at a later date.

## 2020-02-28 NOTE — PATIENT DISCUSSION
Pt taking Bictegravir Sodium, Emtricitabine, and Tenofovir Alafenamide Fumarate for treatment. Pt receiving care from [facility].

## 2020-05-07 ENCOUNTER — ESTABLISHED PATIENT (OUTPATIENT)
Dept: URBAN - METROPOLITAN AREA CLINIC 35 | Facility: CLINIC | Age: 65
End: 2020-05-07

## 2020-05-07 DIAGNOSIS — E11.3212: ICD-10-CM

## 2020-05-07 DIAGNOSIS — H35.373: ICD-10-CM

## 2020-05-07 DIAGNOSIS — H35.30: ICD-10-CM

## 2020-05-07 DIAGNOSIS — H43.813: ICD-10-CM

## 2020-05-07 DIAGNOSIS — E11.39: ICD-10-CM

## 2020-05-07 DIAGNOSIS — E11.3211: ICD-10-CM

## 2020-05-07 DIAGNOSIS — H35.353: ICD-10-CM

## 2020-05-07 DIAGNOSIS — Z79.4: ICD-10-CM

## 2020-05-07 PROCEDURE — 92250 FUNDUS PHOTOGRAPHY W/I&R: CPT

## 2020-05-07 PROCEDURE — 92273 FULL FIELD ERG W/I&R: CPT

## 2020-05-07 PROCEDURE — 92134 CPTRZ OPH DX IMG PST SGM RTA: CPT

## 2020-05-07 PROCEDURE — 92235 FLUORESCEIN ANGRPH MLTIFRAME: CPT

## 2020-05-07 PROCEDURE — 92014 COMPRE OPH EXAM EST PT 1/>: CPT

## 2020-05-07 PROCEDURE — 92287 ANT SGM IMG IR FLRSCN ANGRPH: CPT

## 2020-05-07 ASSESSMENT — VISUAL ACUITY
OD_SC: 20/40
OS_SC: 20/30

## 2020-05-21 NOTE — PATIENT DISCUSSION
IOP OD 21 OS 20.  Continue Rocklatan qhs, Brimonidine BID and Dorzolamide BID (ran out).   Sample of Azopt given to use BID.

## 2020-05-21 NOTE — PATIENT DISCUSSION
Pt taking Bictegravir Sodium, Emtricitabine, and Tenofovir Alafenamide Fumarate for treatment. Pt receiving care from [facility].

## 2020-05-21 NOTE — PATIENT DISCUSSION
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  Recommended vitrectomy surgery after discussion of risks, benefits, and alternatives, including loss of vision, blindness, need for additional surgery and/or retinal detachment, patient elects Retinal surgery.  Surgery will be at [facility].   23g PPV/MP/EL/Diathermy/Analia oil insertion.  Advised cataract surgery will be at a later date.

## 2020-06-19 NOTE — PATIENT DISCUSSION
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  Recommended vitrectomy surgery after discussion of risks, benefits, and alternatives, including loss of vision, blindness, need for additional surgery and/or retinal detachment, patient elects Retinal surgery.  Surgery will be at [facility].   23g PPV/MP/EL/Diathermy/Analia oil insertion.  Advised cataract surgery will be at a later date.

## 2020-06-19 NOTE — PATIENT DISCUSSION
Pt taking Bictegravir Sodium, Emtricitabine, and Tenofovir Alafenamide Fumarate for treatment. Pt receiving care from [facility].

## 2020-08-20 NOTE — PATIENT DISCUSSION
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  Recommended vitrectomy surgery after discussion of risks, benefits, and alternatives, including loss of vision, blindness, need for additional surgery and/or retinal detachment, patient elects Retinal surgery.  Surgery will be at [facility].   23g PPV/MP/EL/Diathermy/Analia oil insertion.  Advised cataract surgery will be at a later date.

## 2020-08-20 NOTE — PATIENT DISCUSSION
Pt taking Bictegravir Sodium, Emtricitabine, and Tenofovir Alafenamide Fumarate for treatment. Pt receiving care from [facility].

## 2020-11-12 ENCOUNTER — ESTABLISHED COMPREHENSIVE EXAM (OUTPATIENT)
Dept: URBAN - METROPOLITAN AREA CLINIC 35 | Facility: CLINIC | Age: 65
End: 2020-11-12

## 2020-11-12 DIAGNOSIS — E11.3211: ICD-10-CM

## 2020-11-12 DIAGNOSIS — H35.373: ICD-10-CM

## 2020-11-12 DIAGNOSIS — H35.30: ICD-10-CM

## 2020-11-12 DIAGNOSIS — E11.39: ICD-10-CM

## 2020-11-12 DIAGNOSIS — E11.3212: ICD-10-CM

## 2020-11-12 DIAGNOSIS — H43.813: ICD-10-CM

## 2020-11-12 DIAGNOSIS — H35.353: ICD-10-CM

## 2020-11-12 DIAGNOSIS — Z98.890: ICD-10-CM

## 2020-11-12 DIAGNOSIS — Z79.4: ICD-10-CM

## 2020-11-12 PROCEDURE — 92235 FLUORESCEIN ANGRPH MLTIFRAME: CPT

## 2020-11-12 PROCEDURE — 92273 FULL FIELD ERG W/I&R: CPT

## 2020-11-12 PROCEDURE — 92250 FUNDUS PHOTOGRAPHY W/I&R: CPT

## 2020-11-12 PROCEDURE — 92287 ANT SGM IMG IR FLRSCN ANGRPH: CPT

## 2020-11-12 PROCEDURE — 92014 COMPRE OPH EXAM EST PT 1/>: CPT

## 2020-11-12 ASSESSMENT — VISUAL ACUITY
OD_SC: 20/40
OS_SC: 20/25-2

## 2020-11-12 ASSESSMENT — TONOMETRY
OS_IOP_MMHG: 18
OD_IOP_MMHG: 16

## 2020-11-13 NOTE — PATIENT DISCUSSION
Pt taking Bictegravir Sodium, Emtricitabine, and Tenofovir Alafenamide Fumarate for treatment. Pt receiving care from [facility].

## 2020-11-13 NOTE — PATIENT DISCUSSION
IOP OD 18.  Pt ran out of Brimonidine and Azopt 3 weeks ago while in [place].  Hold Brimonidine and Azopt for now.  RTC in 2 months.

## 2020-11-13 NOTE — PATIENT DISCUSSION
Improved today on OCT and exam.  Pt ran out of Bromsite 3 weeks ago while in [place].  Will hold Bromsite for now.  RTC in 2 months.

## 2020-11-13 NOTE — PATIENT DISCUSSION
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  Recommended vitrectomy surgery after discussion of risks, benefits, and alternatives, including loss of vision, blindness, need for additional surgery and/or retinal detachment, patient elects Retinal surgery.  Surgery will be at [facility].   23g PPV/MP/EL/Diathermy/Analia oil insertion.  Advised cataract surgery will be at a later date.

## 2020-11-13 NOTE — PATIENT DISCUSSION
GTTS:  HOLD Atropine BID OU for now.  RTC in 2 months.  Pt ran out of Atropine 3 weeks ago while in [place].

## 2020-11-16 NOTE — PATIENT DISCUSSION
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  Recommended vitrectomy surgery after discussion of risks, benefits, and alternatives, including loss of vision, blindness, need for additional surgery and/or retinal detachment, patient elects Retinal surgery.  Surgery will be at [facility].   23g PPV/MP/EL/Diathermy/Analia oil insertion.  Advised cataract surgery will be at a later date. [FreeTextEntry1] : Cardiac wise he is stable.  It would be good if he can lose some weight.  As per the older guidelines his LDL cholesterol of 69 is okay.  However given his overall condition and the extent of his coronary disease it would be better for his LDL to be below 52 and hence cardiovascular protection.

## 2020-12-29 ENCOUNTER — REFRACTION ONLY (OUTPATIENT)
Dept: URBAN - METROPOLITAN AREA CLINIC 35 | Facility: CLINIC | Age: 65
End: 2020-12-29

## 2020-12-29 DIAGNOSIS — H26.491: ICD-10-CM

## 2020-12-29 PROCEDURE — 92012 INTRM OPH EXAM EST PATIENT: CPT

## 2020-12-29 ASSESSMENT — VISUAL ACUITY
OD_BAT: 20/70
OS_SC: 20/30-1
OS_CC: J2
OD_CC: J7
OD_SC: 20/50

## 2021-01-11 ENCOUNTER — YAG EVALUATION (OUTPATIENT)
Dept: URBAN - METROPOLITAN AREA CLINIC 39 | Facility: CLINIC | Age: 66
End: 2021-01-11

## 2021-01-11 ENCOUNTER — SURGERY/PROCEDURE (OUTPATIENT)
Dept: URBAN - METROPOLITAN AREA SURGERY 14 | Facility: SURGERY | Age: 66
End: 2021-01-11

## 2021-01-11 DIAGNOSIS — H26.491: ICD-10-CM

## 2021-01-11 PROCEDURE — 66821 AFTER CATARACT LASER SURGERY: CPT

## 2021-01-11 PROCEDURE — 92014 COMPRE OPH EXAM EST PT 1/>: CPT

## 2021-01-11 ASSESSMENT — VISUAL ACUITY
OD_SC: 20/40
OD_BAT: 20/70
OS_SC: 20/25-2

## 2021-01-11 ASSESSMENT — TONOMETRY
OD_IOP_MMHG: 16
OS_IOP_MMHG: 17

## 2021-01-15 NOTE — PATIENT DISCUSSION
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  Recommended vitrectomy surgery after discussion of risks, benefits, and alternatives, including loss of vision, blindness, need for additional surgery and/or retinal detachment, patient elects Retinal surgery.  Surgery will be at [facility].   23g PPV/MP/EL/Diathermy/Analia oil insertion.  Advised cataract surgery will be at a later date.

## 2021-01-15 NOTE — PATIENT DISCUSSION
Pt taking Bictegravir Sodium, Emtricitabine, and Tenofovir Alafenamide Fumarate for treatment. Pt receiving care from [facility].

## 2021-01-20 ENCOUNTER — YAG POST-OP (OUTPATIENT)
Dept: URBAN - METROPOLITAN AREA CLINIC 35 | Facility: CLINIC | Age: 66
End: 2021-01-20

## 2021-01-20 DIAGNOSIS — Z98.890: ICD-10-CM

## 2021-01-20 DIAGNOSIS — E11.39: ICD-10-CM

## 2021-01-20 DIAGNOSIS — R51.9: ICD-10-CM

## 2021-01-20 DIAGNOSIS — G43.119: ICD-10-CM

## 2021-01-20 DIAGNOSIS — Z96.1: ICD-10-CM

## 2021-01-20 PROCEDURE — 99024 POSTOP FOLLOW-UP VISIT: CPT

## 2021-01-20 ASSESSMENT — TONOMETRY
OS_IOP_MMHG: 18
OD_IOP_MMHG: 17

## 2021-01-20 ASSESSMENT — VISUAL ACUITY
OS_SC: 20/30
OD_SC: 20/40
OU_SC: 20/25

## 2021-02-12 NOTE — PATIENT DISCUSSION
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  Recommended vitrectomy surgery after discussion of risks, benefits, and alternatives, including loss of vision, blindness, need for additional surgery and/or retinal detachment, patient elects Retinal surgery.  Surgery will be at [facility].   23g PPV/MP/EL/Diathermy/Analia oil insertion.  Advised cataract surgery will be at a later date.

## 2021-02-12 NOTE — PATIENT DISCUSSION
Pt taking Bictegravir Sodium, Emtricitabine, and Tenofovir Alafenamide Fumarate for treatment. Pt receiving care from [facility].

## 2021-04-20 NOTE — PATIENT DISCUSSION
Pt taking Bictegravir Sodium, Emtricitabine, and Tenofovir Alafenamide Fumarate for treatment. Pt receiving care from [facility].

## 2021-04-20 NOTE — PATIENT DISCUSSION
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  Recommended vitrectomy surgery after discussion of risks, benefits, and alternatives, including loss of vision, blindness, need for additional surgery and/or retinal detachment, patient elects Retinal surgery.  Surgery will be at [facility].   23g PPV/MP/EL/Diathermy/Analia oil insertion.  Advised cataract surgery will be at a later date.

## 2021-05-11 NOTE — PATIENT DISCUSSION
Due to #5-6. Referred by: Rosy Ocasio MD; Medical Diagnosis (from order):    Diagnosis Information      Diagnosis    847.0 (ICD-9-CM) - S16.1XXA (ICD-10-CM) - Strain of neck muscle, initial encounter    784.0 (ICD-9-CM) - R51.9 (ICD-10-CM) - Cervicogenic headache                Daily Treatment Note    Visit:  21     SUBJECTIVE                                                                                                             Patient reporting about 1-2 episodes a week of nausea with immediate emesis, typically with bending forward and protracting the shoulders. She has been able to adjust for that and manage it. She has had improvement with headache frequency and feels that this is at baseline for the last 20 years. Some numbness or tingling into the right hand at times (digits 1-2), typically with reaching forward. Occasional neck pain noted, typically when laying down trying to sleep. Driving is now tolerable day to day. She denies dizziness, dysarthria, feelings of syncope or ongoing photo/phono phobia.     Pain / Symptoms:  Pain rating (out of 10): Current: 0 ; Best: 0; Worst: 4    OBJECTIVE                                                                                                                     Range of Motion (ROM)   (degrees unless noted; active unless noted; norms in ( ); negative=lacking to 0, positive=beyond 0)   Cervical:    - Flexion (45-50): 45°    - Extension (45-60): 50°    - Rotation (60-80):        • Left: 65°        • Right: 65°    - Side Bend (45):        • Left: 30°        • Right: 40°    Screen(s):   Thoracic: Negative  Shoulder: Negative    Strength  (out of 5 unless noted, standard test position unless noted, lbs tested with hand held dynamometer)   5/5: LUE, RUE    Reflex Testing  Emery's Sign: Left: absent;  Right: absent  Comments / Details: With repeated testing, there was very brief positive Emery's sign that was not reproducible.     Dermatome Testing:  C4 (shoulder,  clavicular and upper scapular areas):     Light Touch: Left: intact Right: intact  C5 (deltoid area, anterior aspect of entire arm to base of thumb):     Light Touch: Left: intact Right: intact  C6 (anterior upper extremity, radial side of hand to 1st and 2nd digit):     Light Touch: Left: intact Right: intact  C7 (lateral upper extremity and forearm to 2nd through 4th digit):     Light Touch: Left: intact Right: intact  C8 (medial upper extremity and forearm to 3rd through 5th):     Light Touch: Left: intact Right: intact  T1 (medial side of forearm to base of 5th digit):    Light Touch: Left: intact Right: intact        Palpation:   Comments / Details: Increased upper trapezius tone  Palpation and posterior to anterior pressure along right C6-T1 increased right upper extremity tingling/pain.    Joint Play:   Cervical Spine:     - O-A:  Left: WFL Right: WFL    - A-A: Left: WFL Right: WFL    - C2-C3: Left: WFL Right: WFL    - C3-C4: Left: WFL Right: WFL    - C4-C5: Left: WFL Right: WFL    - C5-C6: Left: WFL Right: WFL    - C6-C7: Left: WFL Right: hypomobile and pain    - C7-T1: Left: WFL Right: hypomobile and pain  Thoracic Spine:     - Spring Test:      - Upper Thoracic: Left: hypomobile Right: hypomobile      - Middle Thoracic: Left: hypomobile Right: hypomobile      - Lower Thoracic: Left: WFL Right: WFL  Lumbar:     - T12-L1: Left: WFL Right: WFL     - L1-L2: Left: WFL Right: WFL     - L2-L3: Left: WFL Right: WFL    - L3-L4: Left: pain and WFL Right: pain and WFL    - L4-L5: Left: pain and WFL Right: pain and WFL    - L5-S1: Left: pain and WFL Right: pain and WFL  Sacroiliac joint:  Left: WFL Right: WFL  Comments/Details: With patient in cervical extension, posterior to anterior mobilization along lower cervical spine causes discomfort in throat and immediate nausea/near emesis    Special Tests:  Straight Leg Raise:     Passive supine: Left: negative Right: negative    Passive sitting: Left: negative Right:  negative   TREATMENT                                                                                                                  Therapeutic Exercise:  Upper thoracic extension in chair 10\" holds 5 x 3 - tolerated well  Supine upper thoracic extension over 1/2 foam roll 15\" holds 5 x 2  Education on self mobilization with tennis ball on wall right CT junction     Updated separate home exercise program for cervical dysfunction:   Exercises  Thoracic Extension Mobilization on Foam Roll - 2 x daily - 7 x weekly - 2 sets - 5 reps - 10 hold  Seated Thoracic Lumbar Extension with Pectoralis Stretch - 1 x daily - 7 x weekly - 2 sets - 5 reps - 10 hold      Manual Therapy:  STM, trigger point release along right upper trapezius - good improvement  SNAG for lower cervical extension - improves tolerance  *decreases anterior \"tension\" and tolerance to cervical extension.   Posterior to anterior mobilization and inferior glide in prone along upper thoracic bilaterally right > left   Right scapular mobilization in supine and side lying on left: posterior tilt, depression, retraction  Prone posterior to anterior thoracic mobilization grade 3-5      Therapeutic Activity:  See objective testing and findings; discussed C6 radiculopathy on right sided with nerve irritation - no motor involvement. Education with diagram on nature of possible radicular symptoms and if other red flags would be present, to seek additional care.     Skilled input: verbal instruction/cues, tactile instruction/cues, posture correction and facilitation  education/instruction on: soft tissue release techniques and rationale    Writer verbally educated and received verbal consent for hand placement, positioning of patient, and techniques to be performed today from patient for clothing adjustments for techniques, therapist position for techniques and hand placement and palpation for techniques as described above and how they are pertinent to the patient's  plan of care.    Home Exercise Program: * STM along right medial omohyoid region, tracheal line     ASSESSMENT                                                                                                             Patient has improving but ongoing issues with emesis that appears to be irritation with vagus nerve near the cervical spine, likely the lower cervical spine. This is not greatly affecting her function at this point, but does cause her to avoid certain movements and housework. Signs and symptoms also consistent with right sided C6 radiculopathy without motor deficits and with mild intermittent sensory changes. She tolerates an extension based preference at the cervical spine at this time, with some improvement in symptoms anteriorly and posteriorly. Given her current level of function, we do not feel that an MRI would be warranted at this time. She will continue to work on self management for further gains and will schedule additional therapy as needed. Patient on hold until further contact is made.     Patient Education:   Results of above outlined education: Verbalizes understanding and Demonstrates understanding         Therapy procedure time and total treatment time can be found documented on the Time Entry flowsheet

## 2021-05-13 ENCOUNTER — ESTABLISHED COMPREHENSIVE EXAM (OUTPATIENT)
Dept: URBAN - METROPOLITAN AREA CLINIC 35 | Facility: CLINIC | Age: 66
End: 2021-05-13

## 2021-05-13 DIAGNOSIS — Z79.4: ICD-10-CM

## 2021-05-13 DIAGNOSIS — H43.813: ICD-10-CM

## 2021-05-13 DIAGNOSIS — H35.373: ICD-10-CM

## 2021-05-13 DIAGNOSIS — E11.3211: ICD-10-CM

## 2021-05-13 DIAGNOSIS — E11.3212: ICD-10-CM

## 2021-05-13 DIAGNOSIS — E11.39: ICD-10-CM

## 2021-05-13 PROCEDURE — 92235 FLUORESCEIN ANGRPH MLTIFRAME: CPT

## 2021-05-13 PROCEDURE — 92287 ANT SGM IMG IR FLRSCN ANGRPH: CPT

## 2021-05-13 PROCEDURE — 92250 FUNDUS PHOTOGRAPHY W/I&R: CPT

## 2021-05-13 PROCEDURE — 99214 OFFICE O/P EST MOD 30 MIN: CPT

## 2021-05-13 PROCEDURE — 92273 FULL FIELD ERG W/I&R: CPT

## 2021-05-13 ASSESSMENT — VISUAL ACUITY
OS_SC: 20/20
OD_SC: 20/25

## 2021-05-13 ASSESSMENT — TONOMETRY
OS_IOP_MMHG: 18
OD_IOP_MMHG: 19

## 2021-05-21 NOTE — PATIENT DISCUSSION
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  Recommended vitrectomy surgery after discussion of risks, benefits, and alternatives, including loss of vision, blindness, need for additional surgery and/or retinal detachment, patient elects Retinal surgery.  Surgery will be at [facility].   23g PPV/MP/EL/Diathermy/Analia oil insertion.  Advised cataract surgery will be at a later date.

## 2021-05-21 NOTE — PROCEDURE NOTE: CLINICAL
PROCEDURE NOTE: SEAMUS COVID-19 Vaccine The patient was informed of the Emergency Use Authorization of the [address] -19 Vaccine, screened for COVID, and allergic reactions using the [facility] consent form and EUA data administered 4/23/21. 0.5 mL of the [place]. S COVID-19 vaccine was administered. The solution was administered intramuscularly into the (RIGHT/LEFT) deltoid after sanitizing with alcohol. The patient was observed for 15 minutes and no allergic reaction was observed. Vaccine lot Number: [id number]. Expiration: 7/31/21. Cheryl Rojas

## 2021-05-21 NOTE — PATIENT DISCUSSION
Pt stated that he has not received the COVID-19 vaccine. R & B discussed in detail. Pt elects to proceed with J & J vaccine today. Vaccine administered without complication.

## 2021-05-21 NOTE — PATIENT DISCUSSION
Pt taking Bictegravir Sodium, Emtricitabine, and Tenofovir Alafenamide Fumarate for treatment. Pt receiving care from [facility].

## 2021-09-02 ENCOUNTER — ESTABLISHED COMPREHENSIVE EXAM (OUTPATIENT)
Dept: URBAN - METROPOLITAN AREA CLINIC 35 | Facility: CLINIC | Age: 66
End: 2021-09-02

## 2021-09-02 DIAGNOSIS — H35.373: ICD-10-CM

## 2021-09-02 DIAGNOSIS — H43.813: ICD-10-CM

## 2021-09-02 DIAGNOSIS — E11.3211: ICD-10-CM

## 2021-09-02 DIAGNOSIS — E11.3212: ICD-10-CM

## 2021-09-02 DIAGNOSIS — H35.353: ICD-10-CM

## 2021-09-02 PROCEDURE — 99214 OFFICE O/P EST MOD 30 MIN: CPT

## 2021-09-02 PROCEDURE — 92287 ANT SGM IMG IR FLRSCN ANGRPH: CPT

## 2021-09-02 PROCEDURE — 92250 FUNDUS PHOTOGRAPHY W/I&R: CPT

## 2021-09-02 PROCEDURE — 92235 FLUORESCEIN ANGRPH MLTIFRAME: CPT

## 2021-09-02 PROCEDURE — 92273 FULL FIELD ERG W/I&R: CPT

## 2021-09-02 RX ORDER — BROMFENAC 0.76 MG/ML
1 SOLUTION/ DROPS OPHTHALMIC TWICE A DAY
Start: 2021-09-02

## 2021-09-02 ASSESSMENT — TONOMETRY
OS_IOP_MMHG: 19
OD_IOP_MMHG: 19

## 2021-09-02 ASSESSMENT — VISUAL ACUITY
OD_SC: 20/30
OS_SC: 20/30-1

## 2022-01-01 NOTE — PATIENT DISCUSSION
Patient educated on condition and possible poor visual outcome due to advanced disease of #1-2.  See #1-2.  Need to tx systemic cause first, pt advised will eventually require RD repair but must treat infection/systemic issues first. LMP

## 2022-03-03 ENCOUNTER — COMPREHENSIVE EXAM (OUTPATIENT)
Dept: URBAN - METROPOLITAN AREA CLINIC 35 | Facility: CLINIC | Age: 67
End: 2022-03-03

## 2022-03-03 DIAGNOSIS — G43.119: ICD-10-CM

## 2022-03-03 DIAGNOSIS — Z96.1: ICD-10-CM

## 2022-03-03 DIAGNOSIS — H35.373: ICD-10-CM

## 2022-03-03 DIAGNOSIS — E11.3211: ICD-10-CM

## 2022-03-03 DIAGNOSIS — H43.813: ICD-10-CM

## 2022-03-03 DIAGNOSIS — H57.11: ICD-10-CM

## 2022-03-03 DIAGNOSIS — Z98.890: ICD-10-CM

## 2022-03-03 DIAGNOSIS — H04.123: ICD-10-CM

## 2022-03-03 DIAGNOSIS — E11.3212: ICD-10-CM

## 2022-03-03 DIAGNOSIS — R51.9: ICD-10-CM

## 2022-03-03 PROCEDURE — 99214 OFFICE O/P EST MOD 30 MIN: CPT

## 2022-03-03 PROCEDURE — 92273 FULL FIELD ERG W/I&R: CPT

## 2022-03-03 PROCEDURE — 92250 FUNDUS PHOTOGRAPHY W/I&R: CPT

## 2022-03-03 ASSESSMENT — TONOMETRY
OS_IOP_MMHG: 13
OD_IOP_MMHG: 14

## 2022-03-03 ASSESSMENT — VISUAL ACUITY
OS_SC: 20/30-1
OD_SC: 20/30-2

## 2022-07-20 NOTE — PATIENT DISCUSSION
Occupational Therapy   Initial Evaluation     Patient Name: Pj Britt  Age:  61 y.o., Sex:  male  Medical Record #: [id number]  Today's Date: 7/20/2022     Subjective    Pt in bed while SO feeding pt breakfast. SO states that pt can understand english, but he prefers for her to do the translation. Pt was agreeable to shower with encouragement, hesitant with OOB activities d/t fear of falling, significant weakness     Objective       07/20/22 0831   Prior Living Situation   Prior Services None   Housing / Facility 1 Story House   Steps Into Home 3   Steps In Home 0   Rail None   Elevator No   Bathroom Set up Walk In Shower;Shower Curtain;Shower Chair   Equipment Owned 4-Wheel Walker;Wheelchair   Lives with - Patient's Self Care Capacity Spouse;Adult Children   Comments SO states that son (21 y/o) also live in house. From [place], SO will be able to provide 24 hr supervision   Prior Level of ADL Function   Self Feeding Independent   Grooming / Hygiene Requires Assist   Bathing Independent   Dressing Requires Assist   Toileting Independent   Comments SO states that pt was mostly independent but with set up assist.   Prior Level of IADL Function   Medication Management Requires Assist   Laundry Requires Assist   Kitchen Mobility Requires Assist   Finances Requires Assist   Home Management Requires Assist   Shopping Requires Assist   Prior Level Of Mobility Supervision With Device in Community;Uses Wheel Chair for Community Mobility;Supervision With Device in Home   Driving / Transportation Relatives / Others Provide Transportation   Occupation (Pre-Hospital Vocational) Not Employed   Leisure Interests Unable To Determine At This Time   Comments SO and pt do not drive. 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  Recommended vitrectomy surgery after discussion of risks, benefits, and alternatives, including loss of vision, blindness, need for additional surgery and/or retinal detachment, patient elects Retinal surgery.  Surgery will be at [facility].   23g PPV/MP/EL/Diathermy/Analia oil insertion.  Advised cataract surgery will be at a later date. "  Vitals   Pulse 72   Room Air Oximetry 94   Vitals Comments SO stated pt wanted to remove O2 line during shower. SpO2 at 94% after shower on room air. However d/t progressively decreased SpO2 levels, Concentrator (2L) was reconnected.   Cognition    Level of Consciousness Alert   Cognitive Pattern Assessment   Cognitive Pattern Assessment Used BIMS   Brief Interview for Mental Status (BIMS)   Repetition of Three Words (First Attempt) 3   Temporal Orientation: Year Correct   Temporal Orientation: Month Accurate within 5 days   Temporal Orientation: Day Correct   Recall: \"Sock\" Yes, no cue required   Recall: \"Blue\" Yes, no cue required   Recall: \"Bed\" Yes, no cue required   BIMS Summary Score 15   Vision Screen   Vision (Per SO, pt is \"100%\" blind)   Passive ROM Upper Body   Passive ROM Upper Body WDL   Active ROM Upper Body   Active ROM Upper Body  X   Dominant Hand Right   Lt Shoulder Flexion Degrees 75   Rt Shoulder Flexion Degrees 20   Comments Bilateral elbow flexion/extension, wrist flexion/extension WFL, digital abduction/adduction partial AROM   Strength Upper Body   Rt Shoulder Flexion Strength 2-(P-)   Rt Elbow Flexion Strength 3- (F-)   Rt Elbow Extension Strength 3- (F-)   Lt Shoulder Flexion Strength 2-(P-)   Lt Elbow Flexion Strength 3 (F)   Lt Elbow Extension Strength 3 (F)   Comments R sided weakness and shoulder pain   Balance Assessment   Sitting Balance (Static) Fair -   Sitting Balance (Dynamic) Poor   Standing Balance (Static) Poor +   Standing Balance (Dynamic) Poor   Weight Shift Sitting Fair   Weight Shift Standing Poor   Bed Mobility    Supine to Sit Minimal Assist   Sit to Stand Minimal Assist   Scooting Contact Guard Assist   Rolling Standby Assist   Coordination Upper Body   Coordination X   Fine Motor Coordination impaired - slow to complete thumb to finger opposition   Gross Motor Coordination impaired - primarily d/t pain   Comments    Eating   Assistance Needed Physical assistance "   Physical Assistance Level Total Assistance   CARE Score - Eating 1   Eating Discharge Goal   Discharge Goal 5   Oral Hygiene   Assistance Needed Physical Assistance   Physical Assistance Level 25% or less   CARE Score - Oral Hygiene 3   Oral Hygiene Discharge Goal   Discharge Goal 5   Shower/Bathe Self   Assistance Needed Physical Assistance   Physical Assistance Level 25% or less   CARE Score - Shower/Bathe Self 3   Shower/Bathe Self Discharge Goal   Discharge Goal 5   Upper Body Dressing   Assistance Needed Physical Assistance   Physical Assistance Level 76% or more   CARE Score - Upper Body Dressing 2   Upper Body Dressing Discharge Goal   Discharge Goal 5   Lower Body Dressing   Assistance Needed Physical Assistance   Physical Assistance Level 25% or less   CARE Score - Lower Body Dressing 3   Lower Body Dressing Discharge Goal   Discharge Goal 5   Putting On/Taking Off Footwear   Assistance Needed Supervision   Physical Assistance Level No physical assistance   CARE Score - Putting On/Taking Off Footwear 4   Putting On/Taking Off Footwear Discharge Goal   Discharge Goal 5   Toileting Hygiene   Assistance Needed Physical Assistance   Physical Assistance Level 76% or more   CARE Score - Toileting Hygiene 2   Toileting Hygiene Discharge Goal   Discharge Goal 5   Toilet Transfer   Assistance Needed Physical assistance   Physical Assistance Level 25% or less   CARE Score - Toilet Transfer 3   Toilet Transfer Discharge Goal   Discharge Goal 4   Hearing, Speech, and Vision   Ability to Hear Adequate   Ability to See in Adequate Light Severely impaired   Expression of Ideas and Wants Without difficulty   Understanding Verbal and Non-Verbal Content Understands  (Requires some translation from wife)   Functional Level of Assist   Eating Total Assist   Eating Description Increased time;Needs help bringing food to mouth;Needs help scooping food;Set-up of equipment or meal/tube feeding;Verbal cueing  (SO provided feeding  assistance to pt d/t RUE weakness, and LUE fatigue)   Grooming Standby Assist;Seated   Grooming Description Initial preparation for task;Increased time  (Performed face/hair wash)   Bathing Contact Guard Assist   Bathing Description Grab bar;Increased time;Set-up of equipment;Set up for shower sleeve;Supervision for safety;Verbal cueing   Upper Body Dressing Maximal Assist   Upper Body Dressing Description Application of orthotic or brace;Assit with threading arms through sleeves;Increased time;Initial preparation for task;Verbal cueing  (change c-collar pads, don/doff gown)   Lower Body Dressing Minimal Assist   Lower Body Dressing Description Grab bar;Increased time;Initial preparation for task;Supervision for safety;Verbal cueing  (Assist with pulling up pants fully (not pulled up completely))   Tub / Shower Transfers Minimal Assist    Tub Shower Transfer Description Grab bar;Shower bench;Increased time;Initial preparation for task;Requires lift;Verbal cueing  (stand pivot w/c<>shower bench with GB)   Comprehension Modified Independent   Comprehension Description Verbal cues  ()   Expression Independent   Expression Description Verbal cueing   Problem Solving Modified Independent   Problem Solving Description Increased time;Supervision;Verbal cueing  (SO helped translate)   Memory Independent   Memory Description Supervision;Verbal cueing   Problem List   Problem List Decreased Active Daily Living Skills;Decreased Upper Extremity Strength Right;Decreased Upper Extremity AROM Right;Decreased Functional Mobility;Decreased Activity Tolerance;Impaired Posture / Trunk Alignment;Impaired Vision;Limited Knowledge of Post Op Precautions   Precautions   Precautions Fall Risk;Cervical Collar  ;Spinal / Back Precautions    Current Discharge Plan   Current Discharge Plan Return to Prior Living Situation   Benefit    Therapy Benefit Patient Would Benefit from Inpatient Rehab Occupational Therapy to Maximize  [place] with ADLs, IADLs and Functional Mobility.   Interdisciplinary Plan of Care Collaboration   IDT Collaboration with  Family / Caregiver;Nursing   Patient Position at End of Therapy Seated;Call Light within Reach;Tray Table within Reach;Phone within Reach;Family / Friend in Room   Collaboration Comments Nursing providing medication during session where SO was able to assist   Equipment Needs   Assistive Device / DME Front-Wheel Walker;4-Wheel Walker;Wheelchair;Grab Bars In Shower / Tub;Grab Bars By Toilet;Shower Chair   Adaptive Equipment Reacher   Strengths & Barriers   Strengths Able to follow instructions;Adequate strength;Alert and oriented;Independent prior level of function;Pleasant and cooperative;Supportive family;Willingly participates in therapeutic activities   Barriers Fatigue;Generalized weakness;Impaired activity tolerance;Impaired insight/denial of deficits;Pain;Limited mobility;Visual impairment       Assessment  Patient is 61 y.o. male with a diagnosis of Cervical radiculopathy s/p anterior C5 corpectomy, 4-6 fusion, posterior C3-T1 lami fusion .  Additional factors influencing patient status / progress (ie: cognitive factors, co-morbidities, social support, etc): Pt presents with poor standing balance/tolerance, pain, visual impairments, spinal precautions, and decreased strength/activity tolerance that impacts functional performance. Pt demo's motivation to participate in therapy with a little encouragement and shows good potential for rehab with a supportive SO.     Plan  Recommend Occupational Therapy  minutes per day 5-7 days per week for 2 weeks for the following treatments:  OT Orthotics Training, OT Group Therapy, OT Self Care/ADL, OT Community Reintegration, OT Manual Ther Technique, OT Neuro Re-Ed/Balance, OT Therapeutic Activity and OT Therapeutic Exercise.    Passport items to be completed:  Perform bathroom transfers, complete dressing, complete feeding, get ready for  the day, prepare a simple meal, participate in household tasks, adapt home for safety needs, demonstrate home exercise program, complete caregiver training     Goals:  Long term and short term goals have been discussed with patient and spouse they are in agreement.    Occupational Therapy Goals (Active)       Problem: Dressing       Dates: Start: 07/20/22         Goal: STG-Within one week, patient will dress UB with Min A using AE/AD as needed       Dates: Start: 07/20/22            Goal: STG-Within one week, patient will dress LB with SBA using AE/AD as needed       Dates: Start: 07/20/22               Problem: Eating       Dates: Start: 07/20/22         Goal: STG-Within one week, patient will feed self with Min A using AE/AD as needed       Dates: Start: 07/20/22               Problem: Grooming       Dates: Start: 07/20/22         Goal: STG-Within one week, patient will complete grooming with SUP using AE/AD as needed       Dates: Start: 07/20/22               Problem: OT Long Term Goals       Dates: Start: 07/20/22         Goal: LTG-By discharge, patient will complete basic self care tasks SBA/set-up assist using AE/AD as needed       Dates: Start: 07/20/22            Goal: LTG-By discharge, patient will perform bathroom transfers SBA/set-up using AE/AD as needed       Dates: Start: 07/20/22

## 2022-09-08 ENCOUNTER — COMPREHENSIVE EXAM (OUTPATIENT)
Dept: URBAN - METROPOLITAN AREA CLINIC 35 | Facility: CLINIC | Age: 67
End: 2022-09-08

## 2022-09-08 DIAGNOSIS — H04.123: ICD-10-CM

## 2022-09-08 DIAGNOSIS — H35.353: ICD-10-CM

## 2022-09-08 DIAGNOSIS — G43.119: ICD-10-CM

## 2022-09-08 DIAGNOSIS — R51.9: ICD-10-CM

## 2022-09-08 DIAGNOSIS — H57.11: ICD-10-CM

## 2022-09-08 DIAGNOSIS — E11.3213: ICD-10-CM

## 2022-09-08 DIAGNOSIS — H43.813: ICD-10-CM

## 2022-09-08 DIAGNOSIS — H35.373: ICD-10-CM

## 2022-09-08 PROCEDURE — 68761S PUNCTUM PLUG / SILICONE,EACH

## 2022-09-08 PROCEDURE — 92287 ANT SGM IMG IR FLRSCN ANGRPH: CPT

## 2022-09-08 PROCEDURE — 99214 OFFICE O/P EST MOD 30 MIN: CPT

## 2022-09-08 PROCEDURE — 92273 FULL FIELD ERG W/I&R: CPT

## 2022-09-08 PROCEDURE — 92250 FUNDUS PHOTOGRAPHY W/I&R: CPT

## 2022-09-08 PROCEDURE — 92012 INTRM OPH EXAM EST PATIENT: CPT

## 2022-09-08 PROCEDURE — A4263 PERMANENT TEAR DUCT PLUG: HCPCS

## 2022-09-08 PROCEDURE — 92235 FLUORESCEIN ANGRPH MLTIFRAME: CPT

## 2022-09-08 ASSESSMENT — VISUAL ACUITY
OS_SC: 20/30-1
OD_SC: 20/40-1

## 2022-09-08 ASSESSMENT — TONOMETRY
OD_IOP_MMHG: 19
OS_IOP_MMHG: 16

## 2022-11-24 NOTE — PATIENT DISCUSSION
Patient advised that visual recovery may be limited due to retinal condition. Unknown if ever smoked

## 2023-09-14 ENCOUNTER — COMPREHENSIVE EXAM (OUTPATIENT)
Dept: URBAN - METROPOLITAN AREA CLINIC 35 | Facility: CLINIC | Age: 68
End: 2023-09-14

## 2023-09-14 DIAGNOSIS — H35.373: ICD-10-CM

## 2023-09-14 DIAGNOSIS — E11.3313: ICD-10-CM

## 2023-09-14 DIAGNOSIS — H43.813: ICD-10-CM

## 2023-09-14 DIAGNOSIS — H04.123: ICD-10-CM

## 2023-09-14 PROCEDURE — 92235 FLUORESCEIN ANGRPH MLTIFRAME: CPT

## 2023-09-14 PROCEDURE — 92273 FULL FIELD ERG W/I&R: CPT

## 2023-09-14 PROCEDURE — 92250 FUNDUS PHOTOGRAPHY W/I&R: CPT

## 2023-09-14 PROCEDURE — 99214 OFFICE O/P EST MOD 30 MIN: CPT

## 2023-09-14 PROCEDURE — 92287 ANT SGM IMG IR FLRSCN ANGRPH: CPT

## 2023-09-14 ASSESSMENT — TONOMETRY
OD_IOP_MMHG: 18
OS_IOP_MMHG: 14

## 2023-09-14 ASSESSMENT — VISUAL ACUITY
OS_SC: 20/20-1
OD_SC: 20/40-2

## 2023-09-20 ENCOUNTER — ESTABLISHED PATIENT (OUTPATIENT)
Dept: URBAN - METROPOLITAN AREA CLINIC 35 | Facility: CLINIC | Age: 68
End: 2023-09-20

## 2023-09-20 DIAGNOSIS — D49.2: ICD-10-CM

## 2023-09-20 PROCEDURE — 67840 REMOVE EYELID LESION: CPT

## 2023-09-20 PROCEDURE — 92285 EXTERNAL OCULAR PHOTOGRAPHY: CPT

## 2023-09-20 ASSESSMENT — VISUAL ACUITY
OD_SC: 20/40-2
OS_SC: 20/20-1

## 2024-07-18 ENCOUNTER — COMPREHENSIVE EXAM (OUTPATIENT)
Dept: URBAN - METROPOLITAN AREA CLINIC 35 | Facility: CLINIC | Age: 69
End: 2024-07-18

## 2024-07-18 DIAGNOSIS — H35.373: ICD-10-CM

## 2024-07-18 DIAGNOSIS — H01.006: ICD-10-CM

## 2024-07-18 DIAGNOSIS — H57.13: ICD-10-CM

## 2024-07-18 DIAGNOSIS — H04.123: ICD-10-CM

## 2024-07-18 DIAGNOSIS — E11.3313: ICD-10-CM

## 2024-07-18 DIAGNOSIS — H43.813: ICD-10-CM

## 2024-07-18 DIAGNOSIS — H35.363: ICD-10-CM

## 2024-07-18 DIAGNOSIS — H35.353: ICD-10-CM

## 2024-07-18 DIAGNOSIS — H01.003: ICD-10-CM

## 2024-07-18 PROCEDURE — 92287 ANT SGM IMG IR FLRSCN ANGRPH: CPT

## 2024-07-18 PROCEDURE — 92273 FULL FIELD ERG W/I&R: CPT

## 2024-07-18 PROCEDURE — 99214 OFFICE O/P EST MOD 30 MIN: CPT

## 2024-07-18 PROCEDURE — 92235 FLUORESCEIN ANGRPH MLTIFRAME: CPT

## 2024-07-18 PROCEDURE — 92134 CPTRZ OPH DX IMG PST SGM RTA: CPT

## 2024-07-18 ASSESSMENT — VISUAL ACUITY
OD_SC: 20/40+1
OS_SC: 20/25+1

## 2024-07-18 ASSESSMENT — TONOMETRY
OS_IOP_MMHG: 13
OD_IOP_MMHG: 12

## 2025-01-17 ENCOUNTER — COMPREHENSIVE EXAM (OUTPATIENT)
Age: 70
End: 2025-01-17

## 2025-01-17 DIAGNOSIS — H04.123: ICD-10-CM

## 2025-01-17 DIAGNOSIS — H35.353: ICD-10-CM

## 2025-01-17 DIAGNOSIS — H35.363: ICD-10-CM

## 2025-01-17 DIAGNOSIS — H01.003: ICD-10-CM

## 2025-01-17 DIAGNOSIS — H35.373: ICD-10-CM

## 2025-01-17 DIAGNOSIS — H43.813: ICD-10-CM

## 2025-01-17 DIAGNOSIS — E11.3313: ICD-10-CM

## 2025-01-17 DIAGNOSIS — H01.006: ICD-10-CM

## 2025-01-17 PROCEDURE — 92014 COMPRE OPH EXAM EST PT 1/>: CPT

## 2025-01-17 PROCEDURE — 92134 CPTRZ OPH DX IMG PST SGM RTA: CPT | Mod: NC

## 2025-01-17 PROCEDURE — 92250 FUNDUS PHOTOGRAPHY W/I&R: CPT

## 2025-08-07 ENCOUNTER — COMPREHENSIVE EXAM (OUTPATIENT)
Age: 70
End: 2025-08-07

## 2025-08-07 DIAGNOSIS — E11.3313: ICD-10-CM

## 2025-08-07 DIAGNOSIS — H35.373: ICD-10-CM

## 2025-08-07 DIAGNOSIS — H35.353: ICD-10-CM

## 2025-08-07 DIAGNOSIS — H04.123: ICD-10-CM

## 2025-08-07 PROCEDURE — 92235 FLUORESCEIN ANGRPH MLTIFRAME: CPT

## 2025-08-07 PROCEDURE — 92134 CPTRZ OPH DX IMG PST SGM RTA: CPT

## 2025-08-07 PROCEDURE — 99213 OFFICE O/P EST LOW 20 MIN: CPT

## 2025-08-07 PROCEDURE — 92250 FUNDUS PHOTOGRAPHY W/I&R: CPT

## 2025-08-07 PROCEDURE — 92287 ANT SGM IMG IR FLRSCN ANGRPH: CPT

## 2025-08-07 PROCEDURE — 92273 FULL FIELD ERG W/I&R: CPT
